# Patient Record
Sex: MALE | Race: WHITE | Employment: UNEMPLOYED | URBAN - METROPOLITAN AREA
[De-identification: names, ages, dates, MRNs, and addresses within clinical notes are randomized per-mention and may not be internally consistent; named-entity substitution may affect disease eponyms.]

---

## 2018-10-31 ENCOUNTER — HOSPITAL ENCOUNTER (EMERGENCY)
Age: 18
Discharge: HOME | End: 2018-10-31
Attending: EMERGENCY MEDICINE
Payer: SELF-PAY

## 2018-10-31 VITALS
OXYGEN SATURATION: 99 % | WEIGHT: 135 LBS | SYSTOLIC BLOOD PRESSURE: 129 MMHG | TEMPERATURE: 98.5 F | RESPIRATION RATE: 16 BRPM | BODY MASS INDEX: 22.49 KG/M2 | HEART RATE: 95 BPM | HEIGHT: 65 IN | DIASTOLIC BLOOD PRESSURE: 63 MMHG

## 2018-10-31 DIAGNOSIS — L05.91 PILONIDAL CYST WITHOUT ABSCESS: Primary | ICD-10-CM

## 2018-10-31 PROCEDURE — 99283 EMERGENCY DEPT VISIT LOW MDM: CPT | Performed by: EMERGENCY MEDICINE

## 2018-10-31 RX ORDER — TRAMADOL HYDROCHLORIDE 50 MG/1
50 TABLET ORAL
Qty: 12 TAB | Refills: 0 | Status: SHIPPED | OUTPATIENT
Start: 2018-10-31

## 2018-10-31 RX ORDER — AMOXICILLIN AND CLAVULANATE POTASSIUM 875; 125 MG/1; MG/1
1 TABLET, FILM COATED ORAL 2 TIMES DAILY
Qty: 14 TAB | Refills: 0 | Status: SHIPPED | OUTPATIENT
Start: 2018-10-31

## 2018-10-31 NOTE — DISCHARGE INSTRUCTIONS
Pilonidal Abscess: Care Instructions  Your Care Instructions    A pilonidal abscess is an infection caused by an ingrown hair. The abscess occurs in the area of the tailbone and the top of the buttocks. The infection causes a pocket of pus to form. It can be quite painful. Your doctor may have opened and drained the abscess. You can take care of yourself at home to help the area heal. In some cases, the abscess returns. Your doctor may suggest surgery to remove the site of the infection if it comes back. You may have had a sedative to help you relax. You may be unsteady after having sedation. It can take a few hours for the medicine's effects to wear off. Common side effects of sedation include nausea, vomiting, and feeling sleepy or tired. The doctor has checked you carefully, but problems can develop later. If you notice any problems or new symptoms, get medical treatment right away. Follow-up care is a key part of your treatment and safety. Be sure to make and go to all appointments, and call your doctor if you are having problems. It's also a good idea to know your test results and keep a list of the medicines you take. How can you care for yourself at home? · If the doctor gave you a sedative:  ? For 24 hours, don't do anything that requires attention to detail. It takes time for the medicine's effects to completely wear off.  ? For your safety, do not drive or operate any machinery that could be dangerous. Wait until the medicine wears off and you can think clearly and react easily. · If your doctor prescribed antibiotics, take them exactly as directed. Do not stop taking them just because you feel better. You need to take the full course of antibiotics. · Be safe with medicines. Take pain medicines exactly as directed. ? If the doctor gave you a prescription medicine for pain, take it as prescribed.   ? If you are not taking a prescription pain medicine, ask your doctor if you can take an over-the-counter medicine. · If your doctor opened and drained your abscess, you may have gauze or other packing material inside your wound. Follow all instructions from your doctor on how to care for your wound. · Keep the area of your wound very clean. Use wet cotton balls, a warm washcloth, or baby wipes. Clean the area gently, especially after a bowel movement. When should you call for help? Call 911 anytime you think you may need emergency care. For example, call if:    · You have trouble breathing.     · You passed out (lost consciousness).    Call your doctor now or seek immediate medical care if:    · You have new or worse nausea or vomiting.     · You have symptoms of infection, such as:  ? Increased pain, swelling, warmth, or redness. ? Red streaks leading from the area. ? Pus draining from the area. ? A fever.    Watch closely for changes in your health, and be sure to contact your doctor if:    · You do not get better as expected. Where can you learn more? Go to http://ethan-yusef.info/. Enter 22 [id number] in the search box to learn more about \"Pilonidal Abscess: Care Instructions. \"  Current as of: April 18, 2018  Content Version: 11.8  © 9427-4115 [employer]. Care instructions adapted under license by [employer] (which disclaims liability or warranty for this information). If you have questions about a medical condition or this instruction, always ask your healthcare professional. [place] [address] any warranty or liability for your use of this information.

## 2018-10-31 NOTE — ED PROVIDER NOTES
The history is provided by the patient. Anal Pain The current episode started yesterday. The onset was gradual. The problem occurs continuously. The problem has been gradually improving. The pain is moderate. The stool is described as soft. There was no prior successful therapy. There was no prior unsuccessful therapy. Associated symptoms include rectal pain. Pertinent negatives include no anorexia, no fever, no abdominal pain, no diarrhea, no hematemesis, no hemorrhoids, no nausea, no vomiting, no hematuria, no chest pain, no headaches, no coughing, no difficulty breathing and no rash. Urine output has been normal. There were no sick contacts. He has received no recent medical care. History reviewed. No pertinent past medical history. History reviewed. No pertinent surgical history. History reviewed. No pertinent family history. Social History Socioeconomic History  Marital status: SINGLE Spouse name: Not on file  Number of children: Not on file  Years of education: Not on file  Highest education level: Not on file Social Needs  Financial resource strain: Not on file  Food insecurity - worry: Not on file  Food insecurity - inability: Not on file  Transportation needs - medical: Not on file  Transportation needs - non-medical: Not on file Occupational History  Not on file Tobacco Use  Smoking status: Never Smoker  Smokeless tobacco: Never Used Substance and Sexual Activity  Alcohol use: No  
  Frequency: Never  Drug use: No  
 Sexual activity: Not on file Other Topics Concern  Not on file Social History Narrative  Not on file ALLERGIES: Patient has no known allergies. Review of Systems Constitutional: Negative for chills and fever. Respiratory: Negative for cough. Cardiovascular: Negative for chest pain. Gastrointestinal: Positive for rectal pain.  Negative for abdominal pain, anorexia, diarrhea, hematemesis, hemorrhoids, nausea and vomiting. Genitourinary: Negative for genital sores and hematuria. Musculoskeletal: Negative for back pain. Skin: Negative for rash. Neurological: Negative for headaches. All other systems reviewed and are negative. Vitals:  
 10/31/18 1817 BP: 133/66 Pulse: 95 Resp: 16 Temp: 98.5 °F (36.9 °C) SpO2: 99% Weight: 61.2 kg (135 lb) Height: 5' 5\" (1.651 m) Physical Exam  
Constitutional: He is oriented to person, place, and time. He appears well-developed and well-nourished. No distress. HENT:  
Head: Normocephalic and atraumatic. Right Ear: External ear normal.  
Left Ear: External ear normal.  
Mouth/Throat: Oropharynx is clear and moist.  
Eyes: Conjunctivae and EOM are normal. Pupils are equal, round, and reactive to light. Neck: Normal range of motion. Neck supple. Cardiovascular: Normal rate, regular rhythm, normal heart sounds and intact distal pulses. Pulmonary/Chest: Effort normal and breath sounds normal.  
Abdominal: Soft. Bowel sounds are normal. There is no tenderness. Genitourinary: Rectal exam shows no external hemorrhoid and no fissure. Musculoskeletal: Normal range of motion. He exhibits no edema. Neurological: He is alert and oriented to person, place, and time. Skin: Skin is warm and dry. Capillary refill takes less than 2 seconds. Psychiatric: He has a normal mood and affect. Nursing note and vitals reviewed. MDM Number of Diagnoses or Management Options Pilonidal cyst without abscess: new and does not require workup Amount and/or Complexity of Data Reviewed Review and summarize past medical records: yes Risk of Complications, Morbidity, and/or Mortality Presenting problems: low Diagnostic procedures: minimal 
Management options: low Patient Progress Patient progress: stable Procedures

## 2018-11-01 NOTE — ED NOTES
I have reviewed discharge instructions with the patient. The patient verbalized understanding. Patient left ED via Discharge Method: ambulatory to Home with (insert name of family/friend, self, transport POV). Opportunity for questions and clarification provided. Patient given 2 scripts. Augmentin and ultram 
 
 
 
To continue your aftercare when you leave the hospital, you may receive an automated call from our care team to check in on how you are doing. This is a free service and part of our promise to provide the best care and service to meet your aftercare needs.  If you have questions, or wish to unsubscribe from this service please call [phone]. Thank you for Choosing our [facility].